# Patient Record
Sex: MALE | Race: WHITE | Employment: FULL TIME | URBAN - METROPOLITAN AREA
[De-identification: names, ages, dates, MRNs, and addresses within clinical notes are randomized per-mention and may not be internally consistent; named-entity substitution may affect disease eponyms.]

---

## 2017-01-16 ENCOUNTER — OFFICE VISIT (OUTPATIENT)
Dept: CARDIOLOGY CLINIC | Age: 62
End: 2017-01-16

## 2017-01-16 VITALS
BODY MASS INDEX: 32.45 KG/M2 | OXYGEN SATURATION: 96 % | DIASTOLIC BLOOD PRESSURE: 80 MMHG | HEART RATE: 68 BPM | SYSTOLIC BLOOD PRESSURE: 120 MMHG | WEIGHT: 231.8 LBS | HEIGHT: 71 IN

## 2017-01-16 DIAGNOSIS — E78.2 MIXED HYPERLIPIDEMIA: ICD-10-CM

## 2017-01-16 DIAGNOSIS — I25.10 CORONARY ARTERY DISEASE INVOLVING NATIVE CORONARY ARTERY OF NATIVE HEART WITHOUT ANGINA PECTORIS: Primary | ICD-10-CM

## 2017-01-16 DIAGNOSIS — Z00.00 ANNUAL PHYSICAL EXAM: ICD-10-CM

## 2017-01-16 PROCEDURE — 93000 ELECTROCARDIOGRAM COMPLETE: CPT | Performed by: INTERNAL MEDICINE

## 2017-01-16 PROCEDURE — 99214 OFFICE O/P EST MOD 30 MIN: CPT | Performed by: INTERNAL MEDICINE

## 2017-01-16 RX ORDER — NITROGLYCERIN 0.4 MG/1
0.4 TABLET SUBLINGUAL EVERY 5 MIN PRN
Qty: 25 TABLET | Refills: 3 | Status: SHIPPED | OUTPATIENT
Start: 2017-01-16

## 2017-02-20 RX ORDER — ATORVASTATIN CALCIUM 40 MG/1
40 TABLET, FILM COATED ORAL DAILY
Qty: 90 TABLET | Refills: 2 | Status: SHIPPED | OUTPATIENT
Start: 2017-02-20 | End: 2017-11-18 | Stop reason: SDUPTHER

## 2017-11-20 RX ORDER — ATORVASTATIN CALCIUM 40 MG/1
TABLET, FILM COATED ORAL
Qty: 90 TABLET | Refills: 2 | Status: SHIPPED | OUTPATIENT
Start: 2017-11-20 | End: 2018-05-15 | Stop reason: SDUPTHER

## 2018-05-15 ENCOUNTER — OFFICE VISIT (OUTPATIENT)
Dept: CARDIOLOGY CLINIC | Age: 63
End: 2018-05-15

## 2018-05-15 VITALS
DIASTOLIC BLOOD PRESSURE: 72 MMHG | OXYGEN SATURATION: 97 % | HEART RATE: 70 BPM | WEIGHT: 220 LBS | BODY MASS INDEX: 30.68 KG/M2 | SYSTOLIC BLOOD PRESSURE: 102 MMHG

## 2018-05-15 DIAGNOSIS — E78.5 HYPERLIPIDEMIA LDL GOAL <70: ICD-10-CM

## 2018-05-15 DIAGNOSIS — I25.10 CORONARY ARTERY DISEASE INVOLVING NATIVE CORONARY ARTERY OF NATIVE HEART WITHOUT ANGINA PECTORIS: Primary | ICD-10-CM

## 2018-05-15 DIAGNOSIS — Z00.00 ANNUAL PHYSICAL EXAM: ICD-10-CM

## 2018-05-15 PROCEDURE — 99213 OFFICE O/P EST LOW 20 MIN: CPT | Performed by: INTERNAL MEDICINE

## 2018-05-15 PROCEDURE — 93000 ELECTROCARDIOGRAM COMPLETE: CPT | Performed by: INTERNAL MEDICINE

## 2018-05-15 RX ORDER — TAMSULOSIN HYDROCHLORIDE 0.4 MG/1
0.4 CAPSULE ORAL DAILY
COMMUNITY
Start: 2018-04-17

## 2018-05-15 RX ORDER — ATORVASTATIN CALCIUM 40 MG/1
TABLET, FILM COATED ORAL
Qty: 90 TABLET | Refills: 2 | Status: SHIPPED | OUTPATIENT
Start: 2018-05-15 | End: 2019-02-11 | Stop reason: SDUPTHER

## 2019-02-13 RX ORDER — ATORVASTATIN CALCIUM 40 MG/1
TABLET, FILM COATED ORAL
Qty: 90 TABLET | Refills: 2 | Status: SHIPPED | OUTPATIENT
Start: 2019-02-13 | End: 2019-09-25 | Stop reason: SDUPTHER

## 2019-09-25 RX ORDER — ATORVASTATIN CALCIUM 40 MG/1
TABLET, FILM COATED ORAL
Qty: 90 TABLET | Refills: 2 | Status: SHIPPED | OUTPATIENT
Start: 2019-09-25 | End: 2020-01-30

## 2019-12-06 ENCOUNTER — OFFICE VISIT (OUTPATIENT)
Dept: CARDIOLOGY CLINIC | Age: 64
End: 2019-12-06
Payer: MEDICARE

## 2019-12-06 VITALS
DIASTOLIC BLOOD PRESSURE: 60 MMHG | OXYGEN SATURATION: 97 % | WEIGHT: 230 LBS | HEIGHT: 71 IN | BODY MASS INDEX: 32.2 KG/M2 | SYSTOLIC BLOOD PRESSURE: 110 MMHG | HEART RATE: 58 BPM

## 2019-12-06 DIAGNOSIS — I25.10 CORONARY ARTERY DISEASE INVOLVING NATIVE CORONARY ARTERY OF NATIVE HEART WITHOUT ANGINA PECTORIS: Primary | ICD-10-CM

## 2019-12-06 DIAGNOSIS — E78.5 HYPERLIPIDEMIA LDL GOAL <70: ICD-10-CM

## 2019-12-06 PROCEDURE — 93000 ELECTROCARDIOGRAM COMPLETE: CPT | Performed by: INTERNAL MEDICINE

## 2019-12-06 PROCEDURE — 99214 OFFICE O/P EST MOD 30 MIN: CPT | Performed by: INTERNAL MEDICINE

## 2020-01-30 RX ORDER — ATORVASTATIN CALCIUM 40 MG/1
TABLET, FILM COATED ORAL
Qty: 90 TABLET | Refills: 5 | Status: SHIPPED | OUTPATIENT
Start: 2020-01-30 | End: 2020-11-18 | Stop reason: SDUPTHER

## 2020-11-18 RX ORDER — ATORVASTATIN CALCIUM 40 MG/1
TABLET, FILM COATED ORAL
Qty: 90 TABLET | Refills: 0 | Status: SHIPPED | OUTPATIENT
Start: 2020-11-18 | End: 2021-02-02

## 2020-11-18 NOTE — TELEPHONE ENCOUNTER
Medication Refill    Medication needing refilled:  Atorvastatin     Dosage of the medication:  40 mg     How are you taking this medication (QD, BID, TID, QID, PRN):  TAKE 1 TABLET DAILY    30 or 90 day supply called in: 90 day     Which Pharmacy are we sending the medication to?:    [facility], [address] [place] [address] [phone]   Member ID # [id number]

## 2020-12-16 ENCOUNTER — OFFICE VISIT (OUTPATIENT)
Dept: CARDIOLOGY CLINIC | Age: 65
End: 2020-12-16
Payer: MEDICARE

## 2020-12-16 VITALS
HEIGHT: 71 IN | OXYGEN SATURATION: 99 % | TEMPERATURE: 97.7 F | HEART RATE: 60 BPM | WEIGHT: 231 LBS | BODY MASS INDEX: 32.34 KG/M2 | DIASTOLIC BLOOD PRESSURE: 82 MMHG | SYSTOLIC BLOOD PRESSURE: 138 MMHG

## 2020-12-16 PROCEDURE — 3017F COLORECTAL CA SCREEN DOC REV: CPT | Performed by: INTERNAL MEDICINE

## 2020-12-16 PROCEDURE — 1036F TOBACCO NON-USER: CPT | Performed by: INTERNAL MEDICINE

## 2020-12-16 PROCEDURE — 93000 ELECTROCARDIOGRAM COMPLETE: CPT | Performed by: INTERNAL MEDICINE

## 2020-12-16 PROCEDURE — 4040F PNEUMOC VAC/ADMIN/RCVD: CPT | Performed by: INTERNAL MEDICINE

## 2020-12-16 PROCEDURE — 1123F ACP DISCUSS/DSCN MKR DOCD: CPT | Performed by: INTERNAL MEDICINE

## 2020-12-16 PROCEDURE — 99213 OFFICE O/P EST LOW 20 MIN: CPT | Performed by: INTERNAL MEDICINE

## 2020-12-16 PROCEDURE — G8427 DOCREV CUR MEDS BY ELIG CLIN: HCPCS | Performed by: INTERNAL MEDICINE

## 2020-12-16 PROCEDURE — G8484 FLU IMMUNIZE NO ADMIN: HCPCS | Performed by: INTERNAL MEDICINE

## 2020-12-16 PROCEDURE — G8417 CALC BMI ABV UP PARAM F/U: HCPCS | Performed by: INTERNAL MEDICINE

## 2020-12-16 NOTE — PROGRESS NOTES
[address] - Cardiology    CC: \"I feel well\"     HPI:   Danica Ko is a 76 y.o. patient with a past medical history significant for CAD and hyperlipidemia. He had a NSTEMI with a troponin of 19 but no clear lesion identified by cardiac catheterization in 2011 performed by me. He had a 40% mid LAD lesion that could have represented an ulcerated lesion that recanalized. His LV function was normal at that time. We elected to maintain him on dual antiplatelet therapy with aspirin and clopidogrel for at least three months to be conservative. He returns today for follow-up. Today, he reports feeling well overall. He has been walking/jogging for exercise and will complete 30 minutes continuously. He denies chest pain with exertion or rest. He admits to mild shortness of breath but no overt shortness of breath. Patient denies exertional chest pain/pressure, dyspnea at rest, KENNEDY, PND, orthopnea, palpitations, lightheadedness, weight changes, changes in LE edema, and syncope. He reports medication compliance and is tolerating. Current Outpatient Medications   Medication Sig Dispense Refill    atorvastatin (LIPITOR) 40 MG tablet TAKE 1 TABLET DAILY 90 tablet 0    tamsulosin (FLOMAX) 0.4 MG capsule       nitroGLYCERIN (NITROSTAT) 0.4 MG SL tablet Place 1 tablet under the tongue every 5 minutes as needed for Chest pain 25 tablet 3    aspirin EC 81 MG EC tablet Take 162 mg daily (take 2 81mg tablets daily 30 tablet 3    methylcellulose (CITRUCEL) oral powder Take 1 packet by mouth daily. Take by mouth daily.  Multiple Vitamins-Minerals (MULTIVITAL PO) Take 1 tablet by mouth daily. No current facility-administered medications for this visit.       No Known Allergies    Physical Exam:  /82   Pulse 60   Temp 97.7 °F (36.5 °C)   Ht 5' 11\" (1.803 m)   Wt 231 lb (104.8 kg)   SpO2 99%   BMI 32.22 kg/m²    Wt Readings from Last 2 Encounters:   12/16/20 231 lb (104.8 kg)   12/06/19 230 lb (104.3 kg)     General Appearance:  Alert, cooperative, no distress, appears stated age   Head:  Normocephalic, without obvious abnormality, atraumatic   Eyes:  PERRL, conjunctiva/corneas clear       Nose: Nares normal, no drainage or sinus tenderness   Throat: Lips, mucosa, and tongue normal   Neck: Supple, symmetrical, trachea midline, no adenopathy, thyroid: not enlarged, symmetric, no tenderness/mass/nodules, no carotid bruit or JVD       Lungs:   Clear to auscultation bilaterally, respirations unlabored   Chest Wall:  No tenderness or deformity   Heart:  Regular rate and rhythm, S1, S2 normal, no murmur, rub or gallop   Abdomen:   Soft, non-tender, bowel sounds active all four quadrants,  no masses, no organomegaly   Extremities: Extremities normal, atraumatic, no cyanosis or edema   Pulses: Carotid      L   2      R2  Radial       L    2     R2     Skin: Skin color, texture, turgor normal, no rashes or lesions   Pysch: Normal mood and affect   Neurologic: Normal       Lab Results   Component Value Date     08/06/2011    K 4.0 08/06/2011    BUN 9 08/06/2011    CREATININE 0.7 08/06/2011 8/17/20 TC 1385 Trig 75 HDL 43 LDL 80    ECG 5/15/18: Sinus rhythm  ECG 12/6/19: Sinus rhythm  ECG 12/16/20: Sinus rhythm       Assessment:  1. CAD of native coronary arteries without angina  2. Hyperlipidemia with goal LDL<70gm/dL    Plan:   I think that Mr. Houston Holguin  is entirely stable from a cardiovascular standpoint. I see no need to make any changes currently in his medical regimen or pursue further testing. He is not endorsing any symptoms representing angina and his blood pressure is well controlled. His most recent lipid profile was favorable. I have encouraged him to continue his aerobic activity and adhere to a heart healthy diet. I will see him in the office for follow up in 1 year.        This note was scribed in the presence of Ashlee Cardenas MD by [employer], RN.  Physician Attestation:  The scribes documentation has been prepared under my direction and personally reviewed by me in its entirety. I, Dr. Chaidez Erps personally performed the services described in this documentation as scribed by my RN in my presence, and I confirm that the note above accurately reflects all work, treatment, procedures, and medical decision making performed by me.

## 2021-02-02 RX ORDER — ATORVASTATIN CALCIUM 40 MG/1
TABLET, FILM COATED ORAL
Qty: 90 TABLET | Refills: 3 | Status: SHIPPED | OUTPATIENT
Start: 2021-02-02 | End: 2021-12-17 | Stop reason: SDUPTHER

## 2021-12-16 NOTE — PROGRESS NOTES
[address] - Cardiology    CC: \"I feel well\"     HPI: Sole Pennington is a 78 y.o. patient with a past medical history significant for CAD and hyperlipidemia. He had a NSTEMI with a troponin of 19 but no clear lesion identified by cardiac catheterization in 2011 performed by me. He had a 40% mid LAD lesion that could have represented an ulcerated lesion that recanalized. His LV function was normal at that time. We elected to maintain him on dual antiplatelet therapy with aspirin and clopidogrel for at least three months to be conservative. He presents today for follow up. He denies any chest pain with rest or exertion. He continues to walk for exercise and admits to some shortness of breath with this exertion. He reports increased indigestion and attributes this to his food choices. Patient denies exertional chest pain/pressure, dyspnea at rest, KENNEDY, PND, orthopnea, palpitations, lightheadedness, weight changes, changes in LE edema, and syncope. Review of Systems:  Constitutional: No fatigue, weakness, night sweats or fever. HEENT: No new vision difficulties or ringing in the ears. Respiratory: No new SOB, PND, orthopnea or cough. Cardiovascular: See HPI   GI: No n/v, diarrhea, constipation, abdominal pain or changes in bowel habits. No melena, no hematochezia  : No urinary frequency, urgency, incontinence, hematuria or dysuria. Skin: No cyanosis or skin lesions. Musculoskeletal: No new muscle or joint pain. Neurological: No syncope or TIA-like symptoms.   Psychiatric: No anxiety, insomnia or depression    Current Outpatient Medications   Medication Sig Dispense Refill    atorvastatin (LIPITOR) 40 MG tablet TAKE 1 TABLET DAILY 90 tablet 3    tamsulosin (FLOMAX) 0.4 MG capsule Take 0.4 mg by mouth daily       nitroGLYCERIN (NITROSTAT) 0.4 MG SL tablet Place 1 tablet under the tongue every 5 minutes as needed for Chest pain 25 tablet 3    aspirin EC 81 MG EC tablet Take 81 mg by mouth daily  30 tablet 3    methylcellulose (CITRUCEL) oral powder Take 1 packet by mouth daily. Take by mouth daily.  Multiple Vitamins-Minerals (MULTIVITAL PO) Take 1 tablet by mouth daily. No current facility-administered medications for this visit. No Known Allergies    Physical Exam:  /78   Pulse 56   Ht 5' 11\" (1.803 m)   Wt 224 lb (101.6 kg)   SpO2 97%   BMI 31.24 kg/m²    Wt Readings from Last 2 Encounters:   12/17/21 224 lb (101.6 kg)   12/16/20 231 lb (104.8 kg)     General Appearance:  Alert, cooperative, no distress, appears stated age   Head:  Normocephalic, without obvious abnormality, atraumatic   Eyes:  PERRL, conjunctiva/corneas clear       Nose: Nares normal, no drainage or sinus tenderness   Throat: Lips, mucosa, and tongue normal   Neck: Supple, symmetrical, trachea midline, no adenopathy, thyroid: not enlarged, symmetric, no tenderness/mass/nodules, no carotid bruit or JVD       Lungs:   Clear to auscultation bilaterally, respirations unlabored   Chest Wall:  No tenderness or deformity   Heart:  Regular rate and rhythm, S1, S2 normal, no murmur, rub or gallop   Abdomen:   Soft, non-tender, bowel sounds active all four quadrants,  no masses, no organomegaly   Extremities: Extremities normal, atraumatic, no cyanosis or edema   Pulses: Carotid      L   2      R2  Radial       L    2     R2     Skin: Skin color, texture, turgor normal, no rashes or lesions   Pysch: Normal mood and affect   Neurologic: Normal       Lab Results   Component Value Date     08/06/2011    K 4.0 08/06/2011    BUN 9 08/06/2011    CREATININE 0.7 08/06/2011 8/18/21  Trig 62 HDL 44 LDL 79    Personally reviewed and interpreted   ECG 5/15/18: Sinus rhythm  ECG 12/6/19: Sinus rhythm  ECG 12/16/20: Sinus rhythm   ECG 12/17/21: Sinus rhythm with normal intervals and axis      Assessment:  1. CAD of native coronary arteries without angina  2.  Hyperlipidemia with goal LDL<70 mg/dL    Plan:   He is not endorsing any symptoms representing angina and his blood pressure is well controlled today in the office. His most recent lipid profile was not to goal and I will have him increase his dose of atorvastatin to 80 mg daily. He will complete a fasting lipid profile with AST and ALT in 3 months. I have encouraged him to continue his aerobic activity and call with any exertional symptoms. I have personally reviewed all previous testing for this visit today including imaging, lab results and EKG as detailed above. I will see him in the office for follow up in 1 year. This note was scribed in the presence of Tory Gutiérrez MD by [employer], RN. Physician Attestation:  The scribes documentation has been prepared under my direction and personally reviewed by me in its entirety. I, Dr. Dub Spatz personally performed the services described in this documentation as scribed by my RN in my presence, and I confirm that the note above accurately reflects all work, treatment, procedures, and medical decision making performed by me.

## 2021-12-17 ENCOUNTER — OFFICE VISIT (OUTPATIENT)
Dept: CARDIOLOGY CLINIC | Age: 66
End: 2021-12-17
Payer: MEDICARE

## 2021-12-17 VITALS
WEIGHT: 224 LBS | DIASTOLIC BLOOD PRESSURE: 78 MMHG | BODY MASS INDEX: 31.36 KG/M2 | OXYGEN SATURATION: 97 % | SYSTOLIC BLOOD PRESSURE: 124 MMHG | HEART RATE: 56 BPM | HEIGHT: 71 IN

## 2021-12-17 DIAGNOSIS — I25.10 CORONARY ARTERY DISEASE INVOLVING NATIVE CORONARY ARTERY OF NATIVE HEART WITHOUT ANGINA PECTORIS: Primary | ICD-10-CM

## 2021-12-17 DIAGNOSIS — E78.5 HYPERLIPIDEMIA LDL GOAL <70: ICD-10-CM

## 2021-12-17 PROCEDURE — G8484 FLU IMMUNIZE NO ADMIN: HCPCS | Performed by: INTERNAL MEDICINE

## 2021-12-17 PROCEDURE — 93000 ELECTROCARDIOGRAM COMPLETE: CPT | Performed by: INTERNAL MEDICINE

## 2021-12-17 PROCEDURE — 99214 OFFICE O/P EST MOD 30 MIN: CPT | Performed by: INTERNAL MEDICINE

## 2021-12-17 PROCEDURE — 1123F ACP DISCUSS/DSCN MKR DOCD: CPT | Performed by: INTERNAL MEDICINE

## 2021-12-17 PROCEDURE — G8417 CALC BMI ABV UP PARAM F/U: HCPCS | Performed by: INTERNAL MEDICINE

## 2021-12-17 PROCEDURE — 4040F PNEUMOC VAC/ADMIN/RCVD: CPT | Performed by: INTERNAL MEDICINE

## 2021-12-17 PROCEDURE — G8427 DOCREV CUR MEDS BY ELIG CLIN: HCPCS | Performed by: INTERNAL MEDICINE

## 2021-12-17 PROCEDURE — 3017F COLORECTAL CA SCREEN DOC REV: CPT | Performed by: INTERNAL MEDICINE

## 2021-12-17 PROCEDURE — 1036F TOBACCO NON-USER: CPT | Performed by: INTERNAL MEDICINE

## 2021-12-17 RX ORDER — ATORVASTATIN CALCIUM 80 MG/1
80 TABLET, FILM COATED ORAL DAILY
Qty: 90 TABLET | Refills: 3 | Status: SHIPPED | OUTPATIENT
Start: 2021-12-17 | End: 2022-01-31 | Stop reason: SDUPTHER

## 2022-01-26 NOTE — TELEPHONE ENCOUNTER
[phone] (home)   Pt will call back with his mail order pharmacy so we can send it to the right place.

## 2022-01-27 RX ORDER — ATORVASTATIN CALCIUM 40 MG/1
TABLET, FILM COATED ORAL
Qty: 90 TABLET | Refills: 3 | Status: SHIPPED | OUTPATIENT
Start: 2022-01-27 | End: 2022-01-31 | Stop reason: ALTCHOICE

## 2022-01-27 NOTE — TELEPHONE ENCOUNTER
Last ov 12/17/21  Pending appt yearly  Last refill 12/17/21 #90x3  Last labs 8/17/20      Pt changed insurance companies and now uses mail order pharmacy [facility]

## 2022-01-31 RX ORDER — ATORVASTATIN CALCIUM 80 MG/1
80 TABLET, FILM COATED ORAL DAILY
Qty: 90 TABLET | Refills: 3 | Status: SHIPPED | OUTPATIENT
Start: 2022-01-31

## 2022-01-31 RX ORDER — ATORVASTATIN CALCIUM 80 MG/1
80 TABLET, FILM COATED ORAL DAILY
Qty: 90 TABLET | Refills: 3 | Status: CANCELLED | OUTPATIENT
Start: 2022-01-31

## 2022-04-04 DIAGNOSIS — E78.5 HYPERLIPIDEMIA LDL GOAL <70: ICD-10-CM

## 2022-04-04 LAB
ALT SERPL-CCNC: 24 U/L (ref 10–40)
AST SERPL-CCNC: 27 U/L (ref 15–37)
CHOLESTEROL, FASTING: 146 MG/DL (ref 0–199)
HDLC SERPL-MCNC: 49 MG/DL (ref 40–60)
LDL CHOLESTEROL CALCULATED: 82 MG/DL
TRIGLYCERIDE, FASTING: 73 MG/DL (ref 0–150)
VLDLC SERPL CALC-MCNC: 15 MG/DL

## 2023-02-03 ENCOUNTER — OFFICE VISIT (OUTPATIENT)
Dept: CARDIOLOGY CLINIC | Age: 68
End: 2023-02-03
Payer: MEDICARE

## 2023-02-03 VITALS
HEART RATE: 68 BPM | WEIGHT: 230 LBS | SYSTOLIC BLOOD PRESSURE: 120 MMHG | OXYGEN SATURATION: 99 % | BODY MASS INDEX: 32.08 KG/M2 | DIASTOLIC BLOOD PRESSURE: 70 MMHG

## 2023-02-03 DIAGNOSIS — E78.5 HYPERLIPIDEMIA LDL GOAL <70: ICD-10-CM

## 2023-02-03 DIAGNOSIS — I25.10 CORONARY ARTERY DISEASE INVOLVING NATIVE CORONARY ARTERY OF NATIVE HEART WITHOUT ANGINA PECTORIS: Primary | ICD-10-CM

## 2023-02-03 PROCEDURE — G8484 FLU IMMUNIZE NO ADMIN: HCPCS | Performed by: INTERNAL MEDICINE

## 2023-02-03 PROCEDURE — 3017F COLORECTAL CA SCREEN DOC REV: CPT | Performed by: INTERNAL MEDICINE

## 2023-02-03 PROCEDURE — 99213 OFFICE O/P EST LOW 20 MIN: CPT | Performed by: INTERNAL MEDICINE

## 2023-02-03 PROCEDURE — G8427 DOCREV CUR MEDS BY ELIG CLIN: HCPCS | Performed by: INTERNAL MEDICINE

## 2023-02-03 PROCEDURE — 93000 ELECTROCARDIOGRAM COMPLETE: CPT | Performed by: INTERNAL MEDICINE

## 2023-02-03 PROCEDURE — G8417 CALC BMI ABV UP PARAM F/U: HCPCS | Performed by: INTERNAL MEDICINE

## 2023-02-03 PROCEDURE — 1123F ACP DISCUSS/DSCN MKR DOCD: CPT | Performed by: INTERNAL MEDICINE

## 2023-02-03 PROCEDURE — 1036F TOBACCO NON-USER: CPT | Performed by: INTERNAL MEDICINE

## 2023-02-03 NOTE — PROGRESS NOTES
[address] [facility] - Cardiology    CC: \"I am doing okay. \"     HPI: Nada Cooks is a 75 y.o. patient with a past medical history significant for CAD and hyperlipidemia. He had a NSTEMI with a troponin of 19 [facility]) but no clear lesion identified by cardiac catheterization in 2011 performed by me. He had a 40% mid LAD lesion that could have represented an ulcerated lesion that recanalized. His LV function was normal at that time. We elected to maintain him on dual antiplatelet therapy with aspirin and clopidogrel for at least three months to be conservative. Today, he is here for follow up for CAD. He has been doing well. He admits that he has not been getting as much exercise as he used to. He has been busy with family matters. He is short of breath when he is exercising and is most likely due to deconditioning. Patient denies exertional chest pain/pressure, dyspnea at rest, PND, orthopnea, palpitations, lightheadedness, weight changes, changes in LE edema, and syncope. Patient reports compliance to his medications. Review of Systems:  Constitutional: No fatigue, weakness, night sweats or fever. HEENT: No new vision difficulties or ringing in the ears. Respiratory: No new SOB, PND, orthopnea or cough. Cardiovascular: See HPI   GI: No n/v, diarrhea, constipation, abdominal pain or changes in bowel habits. No melena, no hematochezia  : No urinary frequency, urgency, incontinence, hematuria or dysuria. Skin: No cyanosis or skin lesions. Musculoskeletal: No new muscle or joint pain. Neurological: No syncope or TIA-like symptoms.   Psychiatric: No anxiety, insomnia or depression    Current Outpatient Medications   Medication Sig Dispense Refill    atorvastatin (LIPITOR) 80 MG tablet Take 1 tablet by mouth daily 90 tablet 3    tamsulosin (FLOMAX) 0.4 MG capsule Take 0.4 mg by mouth daily       nitroGLYCERIN (NITROSTAT) 0.4 MG SL tablet Place 1 tablet under the tongue every 5 minutes as needed for Chest pain 25 tablet 3    aspirin EC 81 MG EC tablet Take 81 mg by mouth daily  30 tablet 3    methylcellulose (CITRUCEL) oral powder Take 1 packet by mouth daily. Take by mouth daily. Multiple Vitamins-Minerals (MULTIVITAL PO) Take 1 tablet by mouth daily. No current facility-administered medications for this visit.      No Known Allergies    Physical Exam:  /70 (Site: Right Upper Arm, Position: Sitting, Cuff Size: Large Adult)   Pulse 68   Wt 230 lb (104.3 kg)   SpO2 99%   BMI 32.08 kg/m²    Wt Readings from Last 2 Encounters:   02/03/23 230 lb (104.3 kg)   12/17/21 224 lb (101.6 kg)     General Appearance:  Alert, cooperative, no distress, appears stated age   Head:  Normocephalic, without obvious abnormality, atraumatic   Eyes:  PERRL, conjunctiva/corneas clear       Nose: Nares normal, no drainage or sinus tenderness   Throat: Lips, mucosa, and tongue normal   Neck: Supple, symmetrical, trachea midline, no adenopathy, thyroid: not enlarged, symmetric, no tenderness/mass/nodules, no carotid bruit or JVD       Lungs:   Clear to auscultation bilaterally, respirations unlabored   Chest Wall:  No tenderness or deformity   Heart:  Regular rate and rhythm, S1, S2 normal, no murmur, rub or gallop   Abdomen:   Soft, non-tender, bowel sounds active all four quadrants,  no masses, no organomegaly   Extremities: Extremities normal, atraumatic, no cyanosis or edema   Pulses: Carotid      L   2      R2  Radial       L    2     R2     Skin: Skin color, texture, turgor normal, no rashes or lesions   Pysch: Normal mood and affect   Neurologic: Normal       Lab Results   Component Value Date/Time     08/06/2011 06:11 AM    K 4.0 08/06/2011 06:11 AM    BUN 9 08/06/2011 06:11 AM    CREATININE 0.7 08/06/2011 06:11 AM   9/16/22TC 52 Trig 52 HDL 47 LDL 75    Personally reviewed and interpreted   ECG 5/15/18: Sinus rhythm  ECG 12/6/19: Sinus rhythm  ECG 12/16/20: Sinus rhythm   ECG 12/17/21: Sinus rhythm with normal intervals and axis  ECG 2/3/23: Sinus rhythm     Assessment:  1. CAD of native coronary arteries without angina  2. Hyperlipidemia with goal LDL<70 mg/dL    Plan:   I think that Mr. Tico Guidry  is stable from a cardiovascular standpoint. I see no need to make any changes currently in his medical regimen or pursue further testing. His recent lipid profile is WNL. I have encouraged him to continue his aerobic activity and adhere to a heart healthy diet. I have personally reviewed all previous testing for this visit today including imaging, lab results and EKG as detailed above. I will see him in the office for follow up in 1 year. This note was scribed in the presence of Dr Daniel Snider, by Jourdan Rahman RN  Physician Attestation:  The scribes documentation has been prepared under my direction and personally reviewed by me in its entirety. I, Dr. Daniel Snider personally performed the services described in this documentation as scribed by my RN in my presence, and I confirm that the note above accurately reflects all work, treatment, procedures, and medical decision making performed by me.

## 2023-02-16 NOTE — TELEPHONE ENCOUNTER
Medication Refill    Medication needing refilled:  atorvastatin (LIPITOR)     Dosage of the medication:  80 MG tablet     How are you taking this medication (QD, BID, TID, QID, PRN):  Take 1 tablet by mouth daily    30 or 90 day supply called in:90 day supply    When will you run out of your medication:    Which Pharmacy are we sending the medication to?:  [facility] - [address], [address] [phone] - F [phone]

## 2023-02-20 RX ORDER — ATORVASTATIN CALCIUM 80 MG/1
80 TABLET, FILM COATED ORAL DAILY
Qty: 90 TABLET | Refills: 3 | Status: SHIPPED | OUTPATIENT
Start: 2023-02-20

## 2023-02-20 NOTE — TELEPHONE ENCOUNTER
Last OV:  2/3/23  Next OV: Not scheduled. Requested to return in one year.   Last refill: 1/31/22  Most recent Labs: Lipid, AST & ALT 4/4/22  Last EKG (if needed):

## 2024-01-31 NOTE — PROGRESS NOTES
[facility] - Cardiology    CC: \"I am feeling good\"    HPI: Bipin Isabel is a 68 y.o. patient with a past medical history significant for CAD and hyperlipidemia. He had a NSTEMI with a troponin of 19 ([facility]) but no clear lesion identified by cardiac catheterization in 2011 performed by me. He had a 40% mid LAD lesion that could have represented an ulcerated lesion that recanalized. His LV function was normal at that time. We elected to maintain him on dual antiplatelet therapy with aspirin and clopidogrel for at least three months to be conservative.     Today, he is here for follow up with his Wife for his CAD. He has been doing well. States he does stay active with walking  and weight training and states has lost about 20 lbs. .  Patient denies exertional chest pain/pressure, dyspnea at rest, PND, orthopnea, palpitations, lightheadedness, weight changes, changes in LE edema, and syncope. Patient reports compliance to his medications.     Review of Systems:  Constitutional: No fatigue, weakness, night sweats or fever.   HEENT: No new vision difficulties or ringing in the ears.  Respiratory: No new SOB, PND, orthopnea or cough.   Cardiovascular: See HPI   GI: No n/v, diarrhea, constipation, abdominal pain or changes in bowel habits.  No melena, no hematochezia  : No urinary frequency, urgency, incontinence, hematuria or dysuria.  Skin: No cyanosis or skin lesions.  Musculoskeletal: No new muscle or joint pain.  Neurological: No syncope or TIA-like symptoms.  Psychiatric: No anxiety, insomnia or depression    Current Outpatient Medications   Medication Sig Dispense Refill    atorvastatin (LIPITOR) 80 MG tablet Take 1 tablet by mouth daily 90 tablet 3    tamsulosin (FLOMAX) 0.4 MG capsule Take 1 capsule by mouth daily      nitroGLYCERIN (NITROSTAT) 0.4 MG SL tablet Place 1 tablet under the tongue every 5 minutes as needed for Chest pain 25 tablet 3    aspirin EC

## 2024-02-07 ENCOUNTER — OFFICE VISIT (OUTPATIENT)
Dept: CARDIOLOGY CLINIC | Age: 69
End: 2024-02-07
Payer: MEDICARE

## 2024-02-07 VITALS
HEIGHT: 71 IN | DIASTOLIC BLOOD PRESSURE: 74 MMHG | OXYGEN SATURATION: 97 % | SYSTOLIC BLOOD PRESSURE: 118 MMHG | WEIGHT: 212 LBS | BODY MASS INDEX: 29.68 KG/M2 | HEART RATE: 55 BPM

## 2024-02-07 DIAGNOSIS — I25.10 CORONARY ARTERY DISEASE INVOLVING NATIVE CORONARY ARTERY OF NATIVE HEART WITHOUT ANGINA PECTORIS: Primary | ICD-10-CM

## 2024-02-07 DIAGNOSIS — E78.5 HYPERLIPIDEMIA LDL GOAL <70: ICD-10-CM

## 2024-02-07 PROCEDURE — G8419 CALC BMI OUT NRM PARAM NOF/U: HCPCS | Performed by: INTERNAL MEDICINE

## 2024-02-07 PROCEDURE — 93000 ELECTROCARDIOGRAM COMPLETE: CPT | Performed by: INTERNAL MEDICINE

## 2024-02-07 PROCEDURE — 99214 OFFICE O/P EST MOD 30 MIN: CPT | Performed by: INTERNAL MEDICINE

## 2024-02-07 PROCEDURE — G8427 DOCREV CUR MEDS BY ELIG CLIN: HCPCS | Performed by: INTERNAL MEDICINE

## 2024-02-07 PROCEDURE — 1036F TOBACCO NON-USER: CPT | Performed by: INTERNAL MEDICINE

## 2024-02-07 PROCEDURE — 1123F ACP DISCUSS/DSCN MKR DOCD: CPT | Performed by: INTERNAL MEDICINE

## 2024-02-07 PROCEDURE — 3017F COLORECTAL CA SCREEN DOC REV: CPT | Performed by: INTERNAL MEDICINE

## 2024-02-07 PROCEDURE — G8484 FLU IMMUNIZE NO ADMIN: HCPCS | Performed by: INTERNAL MEDICINE

## 2024-02-07 RX ORDER — ATORVASTATIN CALCIUM 80 MG/1
80 TABLET, FILM COATED ORAL DAILY
Qty: 90 TABLET | Refills: 3 | Status: SHIPPED | OUTPATIENT
Start: 2024-02-07 | End: 2024-02-07

## 2024-02-07 RX ORDER — ATORVASTATIN CALCIUM 80 MG/1
80 TABLET, FILM COATED ORAL DAILY
Qty: 90 TABLET | Refills: 3 | Status: SHIPPED | OUTPATIENT
Start: 2024-02-07

## 2024-02-27 RX ORDER — ATORVASTATIN CALCIUM 80 MG/1
80 TABLET, FILM COATED ORAL DAILY
Qty: 90 TABLET | Refills: 3 | OUTPATIENT
Start: 2024-02-27

## 2025-03-03 NOTE — PROGRESS NOTES
[facility] - Cardiology    CC: \" Old fat and sassy\"    HPI: Bipin Isabel is a 69 y.o. patient with a past medical history significant for CAD and hyperlipidemia. He had a NSTEMI with a troponin of 19 ([facility]) but no clear lesion identified by cardiac catheterization in 2011 performed by me. He had a 40% mid LAD lesion that could have represented an ulcerated lesion that recanalized. His LV function was normal at that time. We elected to maintain him on dual antiplatelet therapy with aspirin and clopidogrel for at least three months to be conservative.     Today, he is here for follow up and overall he is feeling well.  He is walking daily for regular exercise.  He did try to do a run/walk and he did become short of breath.  He has since just stuck with walking.  He reports medication compliance and is tolerating. He denies any abnormal bleeding or bruising. He denies exertional chest pain/pressure, dyspnea at rest, PND, orthopnea, palpitations, lightheadedness, weight changes, changes in LE edema, and syncope.     Review of Systems:  Constitutional: No fatigue, weakness, night sweats or fever.   HEENT: No new vision difficulties or ringing in the ears.  Respiratory: No new SOB, PND, orthopnea or cough.   Cardiovascular: See HPI   GI: No n/v, diarrhea, constipation, abdominal pain or changes in bowel habits.  No melena, no hematochezia  : No urinary frequency, urgency, incontinence, hematuria or dysuria.  Skin: No cyanosis or skin lesions.  Musculoskeletal: No new muscle or joint pain.  Neurological: No syncope or TIA-like symptoms.  Psychiatric: No anxiety, insomnia or depression    Current Outpatient Medications   Medication Sig Dispense Refill    atorvastatin (LIPITOR) 80 MG tablet Take 1 tablet by mouth daily 90 tablet 3    tamsulosin (FLOMAX) 0.4 MG capsule Take 1 capsule by mouth daily      aspirin EC 81 MG EC tablet Take 1 tablet by mouth daily 30 tablet

## 2025-03-04 ENCOUNTER — OFFICE VISIT (OUTPATIENT)
Dept: CARDIOLOGY CLINIC | Age: 70
End: 2025-03-04
Payer: MEDICARE

## 2025-03-04 VITALS
BODY MASS INDEX: 31.22 KG/M2 | OXYGEN SATURATION: 98 % | DIASTOLIC BLOOD PRESSURE: 76 MMHG | HEIGHT: 71 IN | RESPIRATION RATE: 16 BRPM | WEIGHT: 223 LBS | HEART RATE: 58 BPM | SYSTOLIC BLOOD PRESSURE: 128 MMHG

## 2025-03-04 DIAGNOSIS — R06.02 SHORTNESS OF BREATH: ICD-10-CM

## 2025-03-04 DIAGNOSIS — I25.10 CORONARY ARTERY DISEASE INVOLVING NATIVE CORONARY ARTERY OF NATIVE HEART WITHOUT ANGINA PECTORIS: Primary | ICD-10-CM

## 2025-03-04 PROCEDURE — 1123F ACP DISCUSS/DSCN MKR DOCD: CPT | Performed by: INTERNAL MEDICINE

## 2025-03-04 PROCEDURE — 1159F MED LIST DOCD IN RCRD: CPT | Performed by: INTERNAL MEDICINE

## 2025-03-04 PROCEDURE — 3017F COLORECTAL CA SCREEN DOC REV: CPT | Performed by: INTERNAL MEDICINE

## 2025-03-04 PROCEDURE — 1036F TOBACCO NON-USER: CPT | Performed by: INTERNAL MEDICINE

## 2025-03-04 PROCEDURE — 99213 OFFICE O/P EST LOW 20 MIN: CPT | Performed by: INTERNAL MEDICINE

## 2025-03-04 PROCEDURE — 93000 ELECTROCARDIOGRAM COMPLETE: CPT | Performed by: INTERNAL MEDICINE

## 2025-03-04 PROCEDURE — G8417 CALC BMI ABV UP PARAM F/U: HCPCS | Performed by: INTERNAL MEDICINE

## 2025-03-04 PROCEDURE — G8427 DOCREV CUR MEDS BY ELIG CLIN: HCPCS | Performed by: INTERNAL MEDICINE

## 2025-03-04 RX ORDER — NYSTATIN AND TRIAMCINOLONE ACETONIDE 100000; 1 [USP'U]/G; MG/G
CREAM TOPICAL 2 TIMES DAILY
COMMUNITY
Start: 2024-10-01 | End: 2025-03-04

## 2025-03-04 RX ORDER — ATORVASTATIN CALCIUM 80 MG/1
80 TABLET, FILM COATED ORAL DAILY
Qty: 90 TABLET | Refills: 3 | Status: SHIPPED | OUTPATIENT
Start: 2025-03-04

## 2025-03-04 RX ORDER — CLOPIDOGREL BISULFATE 75 MG/1
TABLET ORAL
COMMUNITY
End: 2025-03-04

## 2025-03-04 RX ORDER — PREDNISONE 20 MG/1
TABLET ORAL
COMMUNITY
End: 2025-03-04

## 2025-03-04 RX ORDER — OXYCODONE AND ACETAMINOPHEN 5; 325 MG/1; MG/1
TABLET ORAL
COMMUNITY
End: 2025-03-04

## 2025-03-13 ENCOUNTER — HOSPITAL ENCOUNTER (OUTPATIENT)
Age: 70
Discharge: HOME | End: 2025-03-13
Attending: INTERNAL MEDICINE
Payer: MEDICARE

## 2025-03-13 ENCOUNTER — HOSPITAL ENCOUNTER (OUTPATIENT)
Age: 70
Discharge: HOME | End: 2025-03-15
Attending: INTERNAL MEDICINE
Payer: MEDICARE

## 2025-03-13 VITALS — WEIGHT: 223 LBS | HEIGHT: 70 IN | BODY MASS INDEX: 31.92 KG/M2

## 2025-03-13 DIAGNOSIS — R06.02 SHORTNESS OF BREATH: ICD-10-CM

## 2025-03-13 LAB
ECHO BSA: 2.23 M2
NUC STRESS EJECTION FRACTION: 71 %
NUC STRESS LV EDV: 111 ML (ref 67–155)
NUC STRESS LV ESV: 32 ML (ref 22–58)
NUC STRESS LV MASS: 150 G
NUC STRESS LV STROKE VOLUME: 79 ML
STRESS BASELINE DIAS BP: 68 MMHG
STRESS BASELINE HR: 53 BPM
STRESS BASELINE SYS BP: 128 MMHG
STRESS ESTIMATED WORKLOAD: 11.6 METS
STRESS EXERCISE DUR MIN: 9 MIN
STRESS EXERCISE DUR SEC: 28 SEC
STRESS PEAK DIAS BP: 74 MMHG
STRESS PEAK SYS BP: 153 MMHG
STRESS PERCENT HR ACHIEVED: 99 %
STRESS POST PEAK HR: 148 BPM
STRESS RATE PRESSURE PRODUCT: NORMAL BPM*MMHG
STRESS TARGET HR: 150 BPM
TID: 0.94

## 2025-03-13 PROCEDURE — 93017 CV STRESS TEST TRACING ONLY: CPT

## 2025-03-13 PROCEDURE — A9502 TC99M TETROFOSMIN: HCPCS | Performed by: INTERNAL MEDICINE

## 2025-03-13 PROCEDURE — 78452 HT MUSCLE IMAGE SPECT MULT: CPT

## 2025-03-13 PROCEDURE — 3430000000 HC RX DIAGNOSTIC RADIOPHARMACEUTICAL: Performed by: INTERNAL MEDICINE

## 2025-03-13 RX ADMIN — TETROFOSMIN 11.2 MILLICURIE: 1.38 INJECTION, POWDER, LYOPHILIZED, FOR SOLUTION INTRAVENOUS at 08:55

## 2025-03-13 RX ADMIN — TETROFOSMIN 34.5 MILLICURIE: 1.38 INJECTION, POWDER, LYOPHILIZED, FOR SOLUTION INTRAVENOUS at 10:20

## 2025-03-13 NOTE — NURSING NOTE
Patient completed stress portion of cardiac stress test. Patient complained of just being \"winded\" from being on the treadmill symptoms resolved no complaints of chest pain. Pt is discharged to nuclear department for final scan, nuclear tech will remove PIV. Discharge instructions given, pt verbalized understanding to discharge instructions.

## 2025-03-14 ENCOUNTER — RESULTS FOLLOW-UP (OUTPATIENT)
Age: 70
End: 2025-03-14